# Patient Record
Sex: FEMALE
[De-identification: names, ages, dates, MRNs, and addresses within clinical notes are randomized per-mention and may not be internally consistent; named-entity substitution may affect disease eponyms.]

---

## 2020-02-25 ENCOUNTER — HOSPITAL ENCOUNTER (OUTPATIENT)
Dept: HOSPITAL 90 - SHCH | Age: 75
Discharge: HOME | End: 2020-02-25
Attending: INTERNAL MEDICINE
Payer: MEDICARE

## 2020-02-25 DIAGNOSIS — I34.0: Primary | ICD-10-CM

## 2020-02-25 DIAGNOSIS — I34.9: ICD-10-CM

## 2020-02-25 DIAGNOSIS — I10: ICD-10-CM

## 2020-02-25 PROCEDURE — 96374 THER/PROPH/DIAG INJ IV PUSH: CPT

## 2020-02-25 PROCEDURE — 78452 HT MUSCLE IMAGE SPECT MULT: CPT

## 2020-02-25 PROCEDURE — 93017 CV STRESS TEST TRACING ONLY: CPT

## 2020-10-22 LAB
APTT PPP: 25.6 SEC (ref 26.3–35.5)
BASOPHILS NFR BLD AUTO: 0.7 % (ref 0–5)
BUN SERPL-MCNC: 13 MG/DL (ref 7–18)
CHLORIDE SERPL-SCNC: 98 MMOL/L (ref 101–111)
CO2 SERPL-SCNC: 31 MMOL/L (ref 21–32)
CREAT SERPL-MCNC: 2.8 MG/DL (ref 0.5–1.5)
EOSINOPHIL NFR BLD AUTO: 4.8 % (ref 0–8)
ERYTHROCYTE [DISTWIDTH] IN BLOOD BY AUTOMATED COUNT: 14.7 % (ref 11–15.5)
GFR SERPL CREATININE-BSD FRML MDRD: 18 ML/MIN (ref 60–?)
GLUCOSE SERPL-MCNC: 324 MG/DL (ref 70–105)
HCT VFR BLD AUTO: 36.7 % (ref 36–48)
INR PPP: 1.02 (ref 0.85–1.15)
LYMPHOCYTES NFR SPEC AUTO: 15.5 % (ref 21–51)
MCH RBC QN AUTO: 29.9 PG (ref 27–33)
MCHC RBC AUTO-ENTMCNC: 32.4 G/DL (ref 32–36)
MCV RBC AUTO: 92.2 FL (ref 79–99)
MONOCYTES NFR BLD AUTO: 5.1 % (ref 3–13)
NEUTROPHILS NFR BLD AUTO: 73.7 % (ref 40–77)
NRBC BLD MANUAL-RTO: 0 % (ref 0–0.19)
PLATELET # BLD AUTO: 171 K/UL (ref 130–400)
POTASSIUM SERPL-SCNC: 4 MMOL/L (ref 3.5–5.1)
PROTHROMBIN TIME: 11 SEC (ref 9.6–11.6)
RBC # BLD AUTO: 3.98 MIL/UL (ref 4–5.5)
SODIUM SERPL-SCNC: 136 MMOL/L (ref 136–145)
WBC # BLD AUTO: 5.7 K/UL (ref 4.8–10.8)

## 2020-10-26 VITALS — SYSTOLIC BLOOD PRESSURE: 224 MMHG | DIASTOLIC BLOOD PRESSURE: 104 MMHG

## 2020-10-27 ENCOUNTER — HOSPITAL ENCOUNTER (OUTPATIENT)
Dept: HOSPITAL 90 - DAH | Age: 75
Discharge: HOME | End: 2020-10-27
Attending: INTERNAL MEDICINE
Payer: MEDICARE

## 2020-10-27 VITALS — DIASTOLIC BLOOD PRESSURE: 67 MMHG | SYSTOLIC BLOOD PRESSURE: 142 MMHG

## 2020-10-27 VITALS — DIASTOLIC BLOOD PRESSURE: 66 MMHG | SYSTOLIC BLOOD PRESSURE: 140 MMHG

## 2020-10-27 VITALS — DIASTOLIC BLOOD PRESSURE: 65 MMHG | SYSTOLIC BLOOD PRESSURE: 140 MMHG

## 2020-10-27 VITALS — DIASTOLIC BLOOD PRESSURE: 64 MMHG | SYSTOLIC BLOOD PRESSURE: 139 MMHG

## 2020-10-27 VITALS — WEIGHT: 137 LBS | BODY MASS INDEX: 25.86 KG/M2 | HEIGHT: 61 IN

## 2020-10-27 VITALS — DIASTOLIC BLOOD PRESSURE: 64 MMHG | SYSTOLIC BLOOD PRESSURE: 133 MMHG

## 2020-10-27 VITALS — SYSTOLIC BLOOD PRESSURE: 138 MMHG | DIASTOLIC BLOOD PRESSURE: 60 MMHG

## 2020-10-27 VITALS — SYSTOLIC BLOOD PRESSURE: 130 MMHG | DIASTOLIC BLOOD PRESSURE: 59 MMHG

## 2020-10-27 VITALS — DIASTOLIC BLOOD PRESSURE: 79 MMHG | SYSTOLIC BLOOD PRESSURE: 158 MMHG

## 2020-10-27 VITALS — DIASTOLIC BLOOD PRESSURE: 81 MMHG | SYSTOLIC BLOOD PRESSURE: 184 MMHG

## 2020-10-27 VITALS — SYSTOLIC BLOOD PRESSURE: 139 MMHG | DIASTOLIC BLOOD PRESSURE: 50 MMHG

## 2020-10-27 VITALS — DIASTOLIC BLOOD PRESSURE: 62 MMHG | SYSTOLIC BLOOD PRESSURE: 130 MMHG

## 2020-10-27 VITALS — SYSTOLIC BLOOD PRESSURE: 129 MMHG | DIASTOLIC BLOOD PRESSURE: 58 MMHG

## 2020-10-27 DIAGNOSIS — I50.42: ICD-10-CM

## 2020-10-27 DIAGNOSIS — I25.119: Primary | ICD-10-CM

## 2020-10-27 DIAGNOSIS — Z82.49: ICD-10-CM

## 2020-10-27 DIAGNOSIS — Z99.2: ICD-10-CM

## 2020-10-27 DIAGNOSIS — E78.5: ICD-10-CM

## 2020-10-27 DIAGNOSIS — N18.6: ICD-10-CM

## 2020-10-27 DIAGNOSIS — Z79.01: ICD-10-CM

## 2020-10-27 DIAGNOSIS — Z98.890: ICD-10-CM

## 2020-10-27 DIAGNOSIS — Z79.899: ICD-10-CM

## 2020-10-27 DIAGNOSIS — I13.2: ICD-10-CM

## 2020-10-27 DIAGNOSIS — Z89.439: ICD-10-CM

## 2020-10-27 DIAGNOSIS — E11.22: ICD-10-CM

## 2020-10-27 DIAGNOSIS — Z79.82: ICD-10-CM

## 2020-10-27 DIAGNOSIS — Z98.891: ICD-10-CM

## 2020-10-27 LAB
GLUCOSE UR STRIP-MCNC: >=1000 MG/DL
KETONES UR STRIP-MCNC: 15 MG/DL
PH UR STRIP: 8 [PH] (ref 5–8)
PROT UR QL STRIP: >=1000 MG/DL
RBC #/AREA URNS HPF: (no result) /HPF (ref 0–1)
SP GR UR STRIP: 1.02 (ref 1–1.03)
UROBILINOGEN UR STRIP-MCNC: 1 MG/DL (ref 0.2–1)
WBC #/AREA URNS HPF: (no result) /HPF (ref 0–1)

## 2020-10-27 PROCEDURE — 36415 COLL VENOUS BLD VENIPUNCTURE: CPT

## 2020-10-27 PROCEDURE — 81001 URINALYSIS AUTO W/SCOPE: CPT

## 2020-10-27 PROCEDURE — 93458 L HRT ARTERY/VENTRICLE ANGIO: CPT

## 2020-10-27 PROCEDURE — 82948 REAGENT STRIP/BLOOD GLUCOSE: CPT

## 2020-10-27 PROCEDURE — 71045 X-RAY EXAM CHEST 1 VIEW: CPT

## 2020-10-27 PROCEDURE — 85025 COMPLETE CBC W/AUTO DIFF WBC: CPT

## 2020-10-27 PROCEDURE — 85730 THROMBOPLASTIN TIME PARTIAL: CPT

## 2020-10-27 PROCEDURE — 80048 BASIC METABOLIC PNL TOTAL CA: CPT

## 2020-10-27 PROCEDURE — 93005 ELECTROCARDIOGRAM TRACING: CPT

## 2020-10-27 PROCEDURE — 85610 PROTHROMBIN TIME: CPT

## 2020-10-27 NOTE — NUR
HANDOFF COMMUNICATION GIVEN TO AIDA RAMACHANDRAN RN USING SBAR AT BEDSIDE. DRESSING TO RIGHT 
GROIN DRY/INTACT, NO HEMATOMA NO BLEEDING. AREA IS SOFT/NONTENDER.

## 2020-10-27 NOTE — NUR
Received report from Leslie ORTIZ RN.  Pt previously ate lunch and is currently resting 
comfortably, denies any severe pain, nausea or dizziness.

## 2020-10-27 NOTE — NUR
Pt discharged home, tolerating fluids/solids, transferring well from bed to wheelchair, 
denies any severe pain, nausea or dizziness. Pt stated they already voided via bedpan 
following the procedure.  Dressing to right groin remains clean, dry, and intact, site soft 
and non-tender.   I was able to reach daughter Daxa Burgos and gave discharge instructions 
via the telephone.  Prior to that instructions given to pt at bedside.  Pt and daughter 
report no further questions at this time. Pt and daughter informed of consult with Dr. Bernard.  H&P, heart cath diagram, and facesheet faxed to Dr. Bernard office.

## 2020-10-27 NOTE — NUR
PATIENT ARRIVED TO DAY PATIENT ACCOMPANIED BY HERSELF. PATIENT BROUGHT IN BY WHEELCHAIR. 
PATIENT [id number], VITAL SIGNS STABLE. DENIES ANY PAIN AT THIS TIME. PROCEDURE 
VERIFIED/CONFIRMED WITH PATIENT. HOSPITAL ROUTINE EXPLAINED TO PATIENT, ALL 
QUESTIONS/CONCERNS ADDRESSED.

## 2020-10-27 NOTE — NUR
Attempted to reach pt's daughter to give discharge instructions, but no answer. Pt's 6 hours 
of bedrest completed. Pt sat up in bed. Site remains soft and non-tender.  Dressing clean, 
dry and intact.